# Patient Record
Sex: MALE | Race: WHITE | NOT HISPANIC OR LATINO | URBAN - METROPOLITAN AREA
[De-identification: names, ages, dates, MRNs, and addresses within clinical notes are randomized per-mention and may not be internally consistent; named-entity substitution may affect disease eponyms.]

---

## 2018-11-15 ENCOUNTER — TRANSFERRED RECORDS (OUTPATIENT)
Dept: HEALTH INFORMATION MANAGEMENT | Facility: CLINIC | Age: 18
End: 2018-11-15

## 2018-11-27 ENCOUNTER — TRANSFERRED RECORDS (OUTPATIENT)
Dept: HEALTH INFORMATION MANAGEMENT | Facility: CLINIC | Age: 18
End: 2018-11-27

## 2019-01-31 ENCOUNTER — TRANSFERRED RECORDS (OUTPATIENT)
Dept: HEALTH INFORMATION MANAGEMENT | Facility: CLINIC | Age: 19
End: 2019-01-31

## 2020-06-18 ENCOUNTER — TRANSFERRED RECORDS (OUTPATIENT)
Dept: HEALTH INFORMATION MANAGEMENT | Facility: CLINIC | Age: 20
End: 2020-06-18

## 2020-06-25 ENCOUNTER — TRANSFERRED RECORDS (OUTPATIENT)
Dept: HEALTH INFORMATION MANAGEMENT | Facility: CLINIC | Age: 20
End: 2020-06-25

## 2020-06-26 ENCOUNTER — MEDICAL CORRESPONDENCE (OUTPATIENT)
Dept: HEALTH INFORMATION MANAGEMENT | Facility: CLINIC | Age: 20
End: 2020-06-26

## 2020-06-29 ENCOUNTER — TRANSCRIBE ORDERS (OUTPATIENT)
Dept: OTHER | Age: 20
End: 2020-06-29

## 2020-06-29 DIAGNOSIS — K85.90 ACUTE PANCREATITIS WITHOUT NECROSIS OR INFECTION, UNSPECIFIED: Primary | ICD-10-CM

## 2020-06-30 ENCOUNTER — TELEPHONE (OUTPATIENT)
Dept: GASTROENTEROLOGY | Facility: CLINIC | Age: 20
End: 2020-06-30

## 2020-06-30 NOTE — TELEPHONE ENCOUNTER
Advanced Endoscopy     Referring provider:  Lashonda GARCIA [facility] at [place]    Referred to: Advanced Endoscopy Provider Group     Provider Requested:  Johnathan Morris     Referral Received: 6/30/2020     Records received: 7/3/2020     Images received: none    Evaluation for: 2nd opinion, possible TPIAT candidate? Recurrent pancreatiits     Clinical History (per RN review):     Per Patient:  1st pancreatic attack 7 years ago, they annually, then twice a year, increasing in frequency. Last one was only 2 day stay. Currently on PERT, Creon, 24K 3 w/ each. Next ERCP scheduled next for next week for stent maintenance.   Did genetic testing, didn't recall anything coming from it or any noted genetic abnormalities. Patient currently works but is getting increasingly frustrated by impact of frequency of attacks on his life.     Imaging  CT done 2 weeks ago [facility], [phone]  Fax [phone], CT and ERCP images requested.    Referring doctor note dated July 18th 2020    The patient is a 20-year-old male known to the GI team with a history of recurrent acute pancreatitis and history of pancreatic divisum the presented to the hospital for further evaluation management abdominal pain.  He reports that his pain as sharp and severe in the epigastrium nonradiating.  No specific triggers.  Denies fever chills nausea vomiting.  Also denies diarrhea [place] area.  He has not been taking pancreatic enzymes at home.  Serum lipase greater than 3000, LFTs are normal.  A CT of the abdomen was obtained and the findings were of acute interstitial edematous pancreatitis with calcification suggestive of chronic pancreatitis.  There are also findings of bladder thickening, thickening of portions of the colon, prominent lymph nodes which are likely infectious or inflammatory.  Patient has been dealing with recurrent acute pancreatitis for several years now.  Of note PAUL was negative and  IgG4 normal in 2018.  Genetic testing as discussed in this clinic note but no results are noted.    ERCP from July 25, 2020.  Prior minor papilla sphincterotomy appeared open.    The main pancreatic duct was successfully dilated.    The main pancreatic duct was swept and nothing was found.    One plastic stent was placed into the dorsal pancreatic duct.    Repeat ERCP in 2 months for stent removal and follow-up on genetic screening- per patient completed 2 months ago. No noted specifci mutations.     Prior ERCP on January 31, 2019 noted for findings of restenosed narrowed sphincterotomy site with a minor papillotomy was bulging with a flow of contrast to the duct was poor and complete divisum identified.  The dorsal pancreatic sphincterotomy was extended to a total of 3 mm in length.  One plastic stent placed.    ERCP November 27, 2018: Multiple nonbleeding duodenal ulcers with no stigmata of bleeding, divisum was found, INR post sphincterotomy performed, minor papillotomy successfully dilated.    MD review date: 9/3/2020  MD Decision for clinic consultation/Orders:            Referral updates/Patient contacted: left message 9/2- jewel Jay called back on 9/3, [phone]. Ok to talk to mom, chart noted in demographics.

## 2020-06-30 NOTE — TELEPHONE ENCOUNTER
Advanced Endoscopy Clinic Intake form:    Referring/Requesting provider and Health care System:Lashonda GARCIA [facility] at [facility] contact - Name, Phone and Fax number:    : [phone] F: [phone]  Requested provider (if specified): Johnathan Morris    Has patient been evaluated in clinic or had a procedure Advance Endoscopy provider in the last 5 years: No       Indication/Diagnosis for consultation:Acute pancreatitis without necrosis or infection, unspecified [K85.90    Has patient been evaluated by another Gastroenterologist? Yes Lashonda GARCIA     Imaging completed:     CT scan    No   MRI       No      Procedures:     Upper Endoscopy/EGD Yes     Endoscopic Ultrasound/EUS   Yes 11/15/2018    ERCP  Yes, 6/25/20      Colonoscopy No      Are images able/being pushed to our system? *Yes    Is patient aware of request for clinc consultation and ok to be contacted to schedule? Yes

## 2020-07-31 ENCOUNTER — TRANSFERRED RECORDS (OUTPATIENT)
Dept: HEALTH INFORMATION MANAGEMENT | Facility: CLINIC | Age: 20
End: 2020-07-31

## 2020-08-20 ENCOUNTER — MEDICAL CORRESPONDENCE (OUTPATIENT)
Dept: HEALTH INFORMATION MANAGEMENT | Facility: CLINIC | Age: 20
End: 2020-08-20

## 2020-08-20 ENCOUNTER — TRANSFERRED RECORDS (OUTPATIENT)
Dept: HEALTH INFORMATION MANAGEMENT | Facility: CLINIC | Age: 20
End: 2020-08-20

## 2020-09-09 NOTE — TELEPHONE ENCOUNTER
Called patient to schedule video visit. Visit scheduled 9/21    Referring MD Lashonda GARCIA   [facility] at [place]  [address] [facility]  [address]  [place] [address]    Ph:[phone]      Added to [facility] care teams. CT and ERCP images previously requested.     Gudelia Otero, TONG Care Coordinator

## 2020-09-21 ENCOUNTER — VIRTUAL VISIT (OUTPATIENT)
Dept: GASTROENTEROLOGY | Facility: CLINIC | Age: 20
End: 2020-09-21
Payer: COMMERCIAL

## 2020-09-21 VITALS — BODY MASS INDEX: 20.45 KG/M2 | WEIGHT: 151 LBS | HEIGHT: 72 IN

## 2020-09-21 DIAGNOSIS — Q45.3 PANCREAS DIVISUM: Primary | ICD-10-CM

## 2020-09-21 SDOH — HEALTH STABILITY: MENTAL HEALTH: HOW OFTEN DO YOU HAVE A DRINK CONTAINING ALCOHOL?: MONTHLY OR LESS

## 2020-09-21 ASSESSMENT — PAIN SCALES - GENERAL: PAINLEVEL: NO PAIN (0)

## 2020-09-21 ASSESSMENT — MIFFLIN-ST. JEOR: SCORE: 1722.93

## 2020-09-21 NOTE — NURSING NOTE
Chief Complaint   Patient presents with     Consult     Virtual consult       Vitals:    09/21/20 1355   Weight: 68.5 kg (151 lb)   Height: 1.829 m (6')       Body mass index is 20.48 kg/m .      VADIM Mitchell NREMT

## 2020-09-21 NOTE — LETTER
"    9/21/2020         RE: Willem Simon  [address] [address]        Dear Colleague,    Thank you for referring your patient, Willem Simon, to the [facility]. Please see a copy of my visit note below.    Willem Simon is a 20 year old male who is being evaluated via a billable video visit.      The patient has been notified of following:     \"This video visit will be conducted via a call between you and your physician/provider. We have found that certain health care needs can be provided without the need for an in-person physical exam.  This service lets us provide the care you need with a video conversation.  If a prescription is necessary we can send it directly to your pharmacy.  If lab work is needed we can place an order for that and you can then stop by our lab to have the test done at a later time.    Video visits are billed at different rates depending on your insurance coverage.  Please reach out to your insurance provider with any questions.    If during the course of the call the physician/provider feels a video visit is not appropriate, you will not be charged for this service.\"    Patient has given verbal consent for Video visit? Yes  How would you like to obtain your AVS? Mail a copy  If you are dropped from the video visit, the video invite should be resent to: Text to cell phone: [phone]  Will anyone else be joining your video visit? No        Video-Visit Details    Type of service:  Video Visit    Video Start Time: 3.15 PM  Video End Time: 4.00 PM    Originating Location (pt. Location): Home    Distant Location (provider location):  [facility]     Platform used for Video Visit: Juno Moore MD    [facility]:     CC: consultation for possible TPIAT for chronic idiopathic pancreatitis    HPI: 20 year old male with PMH significant for recurrent non-necrotizing pancreatitis and pancreatic divisum, " otherwise healthy, who was referred to our clinic for evaluation for TPIAT in the setting of chronic idiopathic pancreatitis.     Patient had his first pancreatic attack when he was 11 years old. He states that his next attack was 4 years later and now he is experiencing them annually. Last attack was 2-3 weeks ago which he requires admission for 2 days. He has a total of 7 acute pancreatitis in his life. He has had 4 ERCP performed with stent and has had the minor papillatomy twice.     Patient states he has low energy following each attack and has a decreased appetite. Has lost 20 lbs in the last two month. He now weighs 120lbs compared to his previous weight of 150lbs. Before each attack he feels uncomfortable and then begins to experience increasingly worse abdominal pain. Between the episodes he states that he does not feel discomfort or pain and is able to regain the weight. He states that he used Creon 3x with each meal for a week following his most recent attack but then discontinued. He does not see much improvement with the medication. He eats a low fat diet for a month following the episodes.     He has had genetic testing done at [facility] this year; there were no genetic drivers found.     PMH  - recurrent acute pancreatitis, idiopathic otherwise healthy    Family history  He has no family hx of pancreatitis.     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 He drinks alcohol socially about once a month. He does not smoke.     Social History     Tobacco Use     Smoking status: Never Smoker     Smokeless tobacco: Never Used   Substance Use Topics     Alcohol use: Yes     Frequency: Monthly or less     Physical exam: (via video)   GEN: NAD  Eyes: EOMI  Ears: hearing intact  Mouth: MMM  Neck: full ROM  Cardiopulmonary: non labored  Skin: no jaundice  Neuro: awake and oriented  MSK: moves arms equally  Psych: normal affect     PERTINENT STUDIES:  No  results found for this or any previous visit (from the past 168 hour(s)).    Imaging:  none    Endoscopy:  ERCP  1/2019 restenosed narrowed sphincterotomy site with minor papilla that was buldging the flow of contrast through the ducts was poor and complete divisum was identified. The dorsal pancreatic sphincterotomy was extended to a total of 3 mm in length. One plastic stent was placed.     ERCP 11/22/2018: Multiple non-bleeding duodenal ulcers with no stigmata bleeding, divisum was found, minor papilla successfully dilated.     EUS 11/2018: pancreatic duct had an irregularly contoured appearance, had a prominently branched appearance. Had a tortuous/scatic appearance and had hyperechoic walls in the main pancreatic duct. The pancreatic duct measured up to 2 mm in diameter.   Pancreatic parenchymal abnormalities consisting of hyperechoic strands, hyperechoic foci, and lobularity were noted in the entire pancreas. No significant     ASSESSMENT/PLAN:  1. Recurrent acute pancreatitis, idiopathic    20 years old male presented with idiopathic recurrent acute pancreatitis since childhood. Per patient, genetic testing done which was negative (no result here). He does not have pain during the episode and has normal life function currently. Given the current mild effect to his functional status, would be too early for TPIAP at this time. He had multiple ERCP in the past, with currently has one pancreatic stent.     Patient asked regarding supplement, there are some evidence and no harm. Thus, we agree for taking OTC antioxidant vitamins A,C, E and selenium and an Omega 3.    If patient gets increasingly frequent attacks or chronic pain then he is instructed to return to clinic for further evaluation.    Plan  - Remove current sent and stop doing ERCP with stent instead do secretin-stimulated MRCP during attack. Order was written to get secretin-stimulated MRCP one month following sent removal   - Will request records of  the genetic studies from [facility]   - Advised against complete radical pancreatectomy with islet autotransplant   - Patient instructed to not drink alcohol and avoid red meat and other fatty foods   - When patient feels another attack coming on patient advised to get IV fluids at an outpatient clinic.     Patient to follow up in 3 months.     Patient and plan discussed with Dr. Morris.     Mina Pickering MD    Internal Medicine, PGY-3  [facility]  Pager: [phone]    Seen and examined with GI fellow for entire visit, agree with findings and recommendations.  VIDEOVISIT, 45 minutes more than 50% counseling  Pt and mother on visit  Idiopathic recurrent acute pancreatitis, apparently genetic eval negative per mother's report  Divisum, not responsive to repeated endotherapy of minor papilla  Has infrequent attacks w full recovery, no interval pain  Clearly not a candidate for TPIAT  REC:  1_ stent out, in [place], stop ERCPs  2_ Secretin MRCP in [place]  3_ Antioxidant OTC Vit ACE Selenium  4_ Omega 3 suppelement  5_ Lo fat diet  6_ Panc Enzymes not clearly indicated, can stop  7_ IV INFUSION PROTOCOL PER DR VÁZQUEZ - 1-2 L Ringers lactate, Zofran - we can share our protocol w Dr Vázquez - goal to shorten attacks and avoid admissions, ED etc.    Johnathan Morris MD GI Staff        Again, thank you for allowing me to participate in the care of your patient.        Sincerely,        Johnathan Morris MD

## 2020-09-21 NOTE — PROGRESS NOTES
"Willem Simon is a 20 year old male who is being evaluated via a billable video visit.      The patient has been notified of following:     \"This video visit will be conducted via a call between you and your physician/provider. We have found that certain health care needs can be provided without the need for an in-person physical exam.  This service lets us provide the care you need with a video conversation.  If a prescription is necessary we can send it directly to your pharmacy.  If lab work is needed we can place an order for that and you can then stop by our lab to have the test done at a later time.    Video visits are billed at different rates depending on your insurance coverage.  Please reach out to your insurance provider with any questions.    If during the course of the call the physician/provider feels a video visit is not appropriate, you will not be charged for this service.\"    Patient has given verbal consent for Video visit? Yes  How would you like to obtain your AVS? Mail a copy  If you are dropped from the video visit, the video invite should be resent to: Text to cell phone: [phone]  Will anyone else be joining your video visit? No        Video-Visit Details    Type of service:  Video Visit    Video Start Time: 3.15 PM  Video End Time: 4.00 PM    Originating Location (pt. Location): Home    Distant Location (provider location):  [facility]     Platform used for Video Visit: Juno Moore MD    [facility] Hep[facility] Clinic:     CC: consultation for possible TPIAT for chronic idiopathic pancreatitis    HPI: 20 year old male with PMH significant for recurrent non-necrotizing pancreatitis and pancreatic divisum, otherwise healthy, who was referred to our clinic for evaluation for TPIAT in the setting of chronic idiopathic pancreatitis.     Patient had his first pancreatic attack when he was 11 years old. He states that his next attack was 4 years later " and now he is experiencing them annually. Last attack was 2-3 weeks ago which he requires admission for 2 days. He has a total of 7 acute pancreatitis in his life. He has had 4 ERCP performed with stent and has had the minor papillatomy twice.     Patient states he has low energy following each attack and has a decreased appetite. Has lost 20 lbs in the last two month. He now weighs 120lbs compared to his previous weight of 150lbs. Before each attack he feels uncomfortable and then begins to experience increasingly worse abdominal pain. Between the episodes he states that he does not feel discomfort or pain and is able to regain the weight. He states that he used Creon 3x with each meal for a week following his most recent attack but then discontinued. He does not see much improvement with the medication. He eats a low fat diet for a month following the episodes.     He has had genetic testing done at [facility] this year; there were no genetic drivers found.     PMH  - recurrent acute pancreatitis, idiopathic otherwise healthy    Family history  He has no family hx of pancreatitis.     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 He drinks alcohol socially about once a month. He does not smoke.     Social History     Tobacco Use     Smoking status: Never Smoker     Smokeless tobacco: Never Used   Substance Use Topics     Alcohol use: Yes     Frequency: Monthly or less     Physical exam: (via video)   GEN: NAD  Eyes: EOMI  Ears: hearing intact  Mouth: MMM  Neck: full ROM  Cardiopulmonary: non labored  Skin: no jaundice  Neuro: awake and oriented  MSK: moves arms equally  Psych: normal affect     PERTINENT STUDIES:  No results found for this or any previous visit (from the past 168 hour(s)).    Imaging:  none    Endoscopy:  ERCP  1/2019 restenosed narrowed sphincterotomy site with minor papilla that was buldging the flow of contrast through the ducts was poor and  complete divisum was identified. The dorsal pancreatic sphincterotomy was extended to a total of 3 mm in length. One plastic stent was placed.     ERCP 11/22/2018: Multiple non-bleeding duodenal ulcers with no stigmata bleeding, divisum was found, minor papilla successfully dilated.     EUS 11/2018: pancreatic duct had an irregularly contoured appearance, had a prominently branched appearance. Had a tortuous/scatic appearance and had hyperechoic walls in the main pancreatic duct. The pancreatic duct measured up to 2 mm in diameter.   Pancreatic parenchymal abnormalities consisting of hyperechoic strands, hyperechoic foci, and lobularity were noted in the entire pancreas. No significant     ASSESSMENT/PLAN:  1. Recurrent acute pancreatitis, idiopathic    20 years old male presented with idiopathic recurrent acute pancreatitis since childhood. Per patient, genetic testing done which was negative (no result here). He does not have pain during the episode and has normal life function currently. Given the current mild effect to his functional status, would be too early for TPIAP at this time. He had multiple ERCP in the past, with currently has one pancreatic stent.     Patient asked regarding supplement, there are some evidence and no harm. Thus, we agree for taking OTC antioxidant vitamins A,C, E and selenium and an Omega 3.    If patient gets increasingly frequent attacks or chronic pain then he is instructed to return to clinic for further evaluation.    Plan  - Remove current sent and stop doing ERCP with stent instead do secretin-stimulated MRCP during attack. Order was written to get secretin-stimulated MRCP one month following sent removal   - Will request records of the genetic studies from [facility]   - Advised against complete radical pancreatectomy with islet autotransplant   - Patient instructed to not drink alcohol and avoid red meat and other fatty foods   - When patient feels another attack coming on  patient advised to get IV fluids at an outpatient clinic.     Patient to follow up in 3 months.     Patient and plan discussed with Dr. Morris.     Mina Pickering MD    Internal Medicine, PGY-3  [facility]  Pager: [phone]    Seen and examined with GI fellow for entire visit, agree with findings and recommendations.  VIDEOVISIT, 45 minutes more than 50% counseling  Pt and mother on visit  Idiopathic recurrent acute pancreatitis, apparently genetic eval negative per mother's report  Divisum, not responsive to repeated endotherapy of minor papilla  Has infrequent attacks w full recovery, no interval pain  Clearly not a candidate for TPIAT  REC:  1_ stent out, in [place], stop ERCPs  2_ Secretin MRCP in [place]  3_ Antioxidant OTC Vit ACE Selenium  4_ Omega 3 suppelement  5_ Lo fat diet  6_ Panc Enzymes not clearly indicated, can stop  7_ IV INFUSION PROTOCOL PER DR VÁZQUEZ - 1-2 L Ringers lactate, Zofran - we can share our protocol w Dr Vázquez - goal to shorten attacks and avoid admissions, ED etc.    Johnathan Morris MD GI Staff

## 2020-09-28 NOTE — PATIENT INSTRUCTIONS
Follow up:    Dr. Morris has outlined the following steps after your recent clinic visit:    - Remove current sent and stop doing ERCP with stent instead do secretin-stimulated MRCP during attack. Order was written to get secretin-stimulated MRCP one month following sent removal. Clinic notes faxed to [facility] so they can order test.     - Will request records of the genetic studies from [facility]     - Advised against complete radical pancreatectomy with islet autotransplant     - Patient instructed to not drink alcohol and avoid red meat and other fatty foods     - When patient feels another attack coming on patient advised to get IV fluids at an outpatient clinic. We will hold on this portion of care plan for now as patient isn't experiencing symptoms frequently enough for infusion plan to be executed on a regular basis.      Patient to follow up in 3 months.     Please call with any questions or concerns regarding your clinic visit today.    It is a pleasure being involved in your health care.    Contacts post-consultation depending on your need:    Schedule Clinic Appointments            [phone] # 1   M-F 7:30 - 5 pm    Gudelia Otero RN Care Coordinator  [phone]     OR Procedure Scheduling                             [phone]    My Chart is available 24 hours a day and is a secure way to access your records and communicate with your care team.  I strongly recommend signing up if you haven't already done so, if you are comfortable with computers.  If you would like to inquire about this or are having problems with My Chart access, you may call [phone] or go online at [email or web address].  Please allow at least 24 hours for a response and extra time on weekends and Holidays.